# Patient Record
Sex: FEMALE | Race: WHITE | NOT HISPANIC OR LATINO | URBAN - METROPOLITAN AREA
[De-identification: names, ages, dates, MRNs, and addresses within clinical notes are randomized per-mention and may not be internally consistent; named-entity substitution may affect disease eponyms.]

---

## 2023-05-08 ENCOUNTER — APPOINTMENT (RX ONLY)
Dept: URBAN - METROPOLITAN AREA CLINIC 40 | Facility: CLINIC | Age: 43
Setting detail: DERMATOLOGY
End: 2023-05-08

## 2023-05-08 DIAGNOSIS — L24 IRRITANT CONTACT DERMATITIS: ICD-10-CM

## 2023-05-08 PROBLEM — L24.9 IRRITANT CONTACT DERMATITIS, UNSPECIFIED CAUSE: Status: ACTIVE | Noted: 2023-05-08

## 2023-05-08 PROCEDURE — ? DIAGNOSIS COMMENT

## 2023-05-08 PROCEDURE — ? TREATMENT REGIMEN

## 2023-05-08 PROCEDURE — 99203 OFFICE O/P NEW LOW 30 MIN: CPT

## 2023-05-08 PROCEDURE — ? COUNSELING

## 2023-05-08 ASSESSMENT — LOCATION SIMPLE DESCRIPTION DERM: LOCATION SIMPLE: LEFT BREAST

## 2023-05-08 ASSESSMENT — LOCATION DETAILED DESCRIPTION DERM: LOCATION DETAILED: LEFT PERIAREOLAR BREAST 5-6:00 REGION

## 2023-05-08 ASSESSMENT — LOCATION ZONE DERM: LOCATION ZONE: TRUNK

## 2023-05-08 NOTE — PROCEDURE: DIAGNOSIS COMMENT
Comment: Dr. Alamo breast surgeon sees them every six months secondary to family history.  PA at Dr. Alamo? office did punch biopsy of rash last week results not in yet. Dry itchy ulcerated rash on breast since January. Mammogram last week normal. Patient is a runner. Tried over-the-counter hydrocortisone antifungal prescription, but has not helped rash. Patient presents for evaluation. Family history of breast cancer. 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 Will call patient once biopsy results are received and reviewed.  Need to R/o Paget's
Render Risk Assessment In Note?: no
Detail Level: Simple

## 2023-05-08 NOTE — PROCEDURE: TREATMENT REGIMEN
Discontinue Regimen: - soap to breasts
Detail Level: Zone
Initiate Treatment: - Vaseline to breast until punch biopsy results are in: done last week at Dr. Alamo? office\\n- avoid manipulation

## 2023-05-08 NOTE — HPI: RASH
What Type Of Note Output Would You Prefer (Optional)?: Bullet Format
Is The Patient Presenting As Previously Scheduled?: Yes
How Severe Is Your Rash?: moderate
Is This A New Presentation, Or A Follow-Up?: Rash
Additional History: Dr. Alamo breast surgeon sees them every six months secondary to family history.  PA at Dr. Alamo’ office did punch biopsy of rash last week results not in yet. Dry itchy ulcerated rash on breast since January. Mammogram last week normal. Tried over-the-counter hydrocortisone antifungal prescription, but has not helped rash. Patient presents for evaluation. Family history of breast cancer. 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

## 2023-05-25 ENCOUNTER — RX ONLY (OUTPATIENT)
Age: 43
Setting detail: RX ONLY
End: 2023-05-25

## 2023-05-25 RX ORDER — DESOXIMETASONE 0.5 MG/G
OINTMENT TOPICAL
Qty: 100 | Refills: 0 | Status: ERX | COMMUNITY
Start: 2023-05-25